# Patient Record
Sex: MALE | Race: OTHER | HISPANIC OR LATINO | Employment: OTHER | URBAN - METROPOLITAN AREA
[De-identification: names, ages, dates, MRNs, and addresses within clinical notes are randomized per-mention and may not be internally consistent; named-entity substitution may affect disease eponyms.]

---

## 2022-03-20 ENCOUNTER — HOSPITAL ENCOUNTER (EMERGENCY)
Facility: HOSPITAL | Age: 37
Discharge: HOME | End: 2022-03-20
Attending: FAMILY MEDICINE

## 2022-03-20 VITALS
RESPIRATION RATE: 18 BRPM | BODY MASS INDEX: 24.16 KG/M2 | HEIGHT: 65 IN | HEART RATE: 92 BPM | OXYGEN SATURATION: 99 % | SYSTOLIC BLOOD PRESSURE: 154 MMHG | TEMPERATURE: 98 F | WEIGHT: 145 LBS | DIASTOLIC BLOOD PRESSURE: 92 MMHG

## 2022-03-20 DIAGNOSIS — S63.602A SPRAIN OF LEFT THUMB, UNSPECIFIED SITE OF DIGIT, INITIAL ENCOUNTER: Primary | ICD-10-CM

## 2022-03-20 PROCEDURE — 99283 EMERGENCY DEPT VISIT LOW MDM: CPT | Mod: 25,ER

## 2022-03-20 PROCEDURE — 29125 APPL SHORT ARM SPLINT STATIC: CPT | Mod: LT

## 2022-03-20 RX ORDER — IBUPROFEN 800 MG/1
800 TABLET ORAL 3 TIMES DAILY
Qty: 20 TABLET | Refills: 0 | Status: SHIPPED | OUTPATIENT
Start: 2022-03-20

## 2022-03-20 NOTE — Clinical Note
"Horacio Fryursula Renteria was seen and treated in our emergency department on 3/20/2022.  He may return to work on 03/27/2022.       If you have any questions or concerns, please don't hesitate to call.      DONELL Fam RN    "

## 2022-03-20 NOTE — ED PROVIDER NOTES
Encounter Date: 3/20/2022       History     Chief Complaint   Patient presents with    Left hand swelling      36-year-old male to the ER for evaluation of pain to the left hand.  Pain began 2 days ago.  Patient reports injury to the hand week ago while playing baseball.  For 2 days he has had swelling and pain with discoloration noted to the thenar eminence        Review of patient's allergies indicates:  No Known Allergies  No past medical history on file.  No past surgical history on file.  No family history on file.     Review of Systems   Constitutional: Negative for fever.   HENT: Negative for sore throat.    Respiratory: Negative for shortness of breath.    Cardiovascular: Negative for chest pain.   Gastrointestinal: Negative for nausea.   Genitourinary: Negative for dysuria.   Musculoskeletal: Positive for arthralgias. Negative for back pain.   Skin: Negative for rash.   Neurological: Negative for weakness.   Hematological: Does not bruise/bleed easily.       Physical Exam     Initial Vitals [03/20/22 1226]   BP Pulse Resp Temp SpO2   (!) 154/92 92 18 98.3 °F (36.8 °C) 99 %      MAP       --         Physical Exam    Constitutional: Vital signs are normal. He appears well-developed and well-nourished.   HENT:   Head: Normocephalic and atraumatic.   Right Ear: Hearing normal.   Left Ear: Hearing normal.   Eyes: Conjunctivae are normal.   Cardiovascular: Normal rate and regular rhythm.   Abdominal: Abdomen is soft. Bowel sounds are normal.   Musculoskeletal:         General: Normal range of motion.      Comments: Swelling bruising and pain to the left thenar eminence.  Range of motion of the left thumb is limited due to pain.  There are no open injuries.  No bleeding.  No wrist pain.  No snuffbox tenderness.  Capillary refill is good in the thumb.  Sensation is good.     Neurological: He is alert and oriented to person, place, and time.   Skin: Skin is warm and intact.   Psychiatric: He has a normal mood and  affect. His speech is normal and behavior is normal. Cognition and memory are normal.         ED Course   Procedures  Labs Reviewed - No data to display       Imaging Results          X-Ray Hand 3 view Left (Final result)  Result time 03/20/22 13:03:34    Final result by Jhonny Alvarez MD (03/20/22 13:03:34)                 Impression:      No acute radiographic abnormality of the left hand.      Electronically signed by: Jhonny Alvarez  Date:    03/20/2022  Time:    13:03             Narrative:    EXAMINATION:  XR HAND COMPLETE 3 VIEW LEFT    CLINICAL HISTORY:  left hand pain;.    TECHNIQUE:  PA, lateral, and oblique views of the left hand were performed.    COMPARISON:  None    FINDINGS:  No acute fracture.  Joint alignment is anatomic.  Joint spaces appear maintained.  No osseous erosions.  No significant periarticular calcifications.                                 Medications - No data to display  Medical Decision Making:   History:   Old Medical Records: I decided to obtain old medical records.  Old Records Summarized: records from clinic visits.  Initial Assessment:   36-year-old male with isolated injury to the left hand  Differential Diagnosis:   Fracture, contusion, dislocation, subluxation  Independently Interpreted Test(s):   I have ordered and independently interpreted X-rays - see summary below.       <> Summary of X-Ray Reading(s): No acute fracture identified  Clinical Tests:   Radiological Study: Ordered and Reviewed  ED Management:  Plan:  X-ray and reassessment  X-ray reveals no acute osseous findings.  I will place the patient in a thumb spica splint for 1 week for immobilization.  He does have significant swelling and bruising to the area with limited of thumb a range of motion.  There is some clinical suspicion for possible ligamentous injury.  I have referred the patient to orthopedics for follow-up.  Return precautions given.                      Clinical Impression:   Final  diagnoses:  [S40.615T] Sprain of left thumb, unspecified site of digit, initial encounter (Primary)          ED Disposition Condition    Discharge Stable        ED Prescriptions     Medication Sig Dispense Start Date End Date Auth. Provider    ibuprofen (ADVIL,MOTRIN) 800 MG tablet Take 1 tablet (800 mg total) by mouth 3 (three) times daily. 20 tablet 3/20/2022  Mark Gamez PA-C        Follow-up Information     Follow up With Specialties Details Why Contact Info    primary care               Mark Gamez PA-C  03/20/22 6505

## 2022-03-20 NOTE — DISCHARGE INSTRUCTIONS
Wear the thumb spica splint for the next week  Follow-up with a primary care doctor or an orthopedist  Return to the ER as needed  Use ibuprofen as needed for pain    Thank you for coming to our Emergency Department today. It is important to remember that some problems are difficult to diagnose and may not be found during your Emergency Department visit. Be sure to follow up with your primary care doctor and review all labs/imaging/tests that were performed during this visit with them. Some labs/tests may be outside of the normal range and require non-emergent follow-up and further investigation to help diagnose/exclude/prevent complications or other medical conditions.    If you do not have a primary care doctor, you may contact the one listed on your discharge paperwork or you may also call the [facility] Appointment Desk at [phone] to schedule an appointment and establish care with one. It is important to your health that you have a primary care doctor.    Please take all medications as directed. All medications may potentially have side-effects and it is impossible to predict which medications may give you side-effects or what side-effects (if any) they will give you.. If you feel that you are having a negative effect or side-effect of any medication you should immediately stop taking them and seek medical attention. If you feel that you are having a life-threatening reaction call [phone].    Return to the ER with any questions/concerns, new/concerning symptoms, worsening or failure to improve.     Do not drive, swim, climb to height, take a bath or make any important decisions for 24 hours if you have received any pain medications, sedatives or mood altering drugs during your ER visit.

## 2022-03-23 ENCOUNTER — TELEPHONE (OUTPATIENT)
Dept: ADMINISTRATIVE | Facility: OTHER | Age: 37
End: 2022-03-23